# Patient Record
Sex: FEMALE | Race: WHITE | URBAN - METROPOLITAN AREA
[De-identification: names, ages, dates, MRNs, and addresses within clinical notes are randomized per-mention and may not be internally consistent; named-entity substitution may affect disease eponyms.]

---

## 2019-04-11 LAB
CREATININE, EXTERNAL: 0.76
LDL-C, EXTERNAL: 92

## 2019-11-11 LAB — MICROALBUMIN UR TEST STR-MCNC: NORMAL MG/DL

## 2020-09-14 VITALS
SYSTOLIC BLOOD PRESSURE: 132 MMHG | TEMPERATURE: 97.2 F | HEART RATE: 87 BPM | OXYGEN SATURATION: 99 % | DIASTOLIC BLOOD PRESSURE: 82 MMHG | HEIGHT: 68 IN | BODY MASS INDEX: 44.25 KG/M2 | WEIGHT: 292 LBS

## 2020-09-14 PROBLEM — E66.01 CLASS 3 SEVERE OBESITY IN ADULT (HCC): Status: ACTIVE | Noted: 2020-09-14

## 2020-09-14 PROBLEM — Q79.60 EHLERS-DANLOS SYNDROME: Status: ACTIVE | Noted: 2020-09-14

## 2020-09-14 RX ORDER — FLUTICASONE PROPIONATE 50 MCG
2 SPRAY, SUSPENSION (ML) NASAL DAILY
COMMUNITY
End: 2021-01-12

## 2021-01-12 ENCOUNTER — OFFICE VISIT (OUTPATIENT)
Dept: PRIMARY CARE CLINIC | Age: 31
End: 2021-01-12
Payer: COMMERCIAL

## 2021-01-12 VITALS
BODY MASS INDEX: 44.41 KG/M2 | SYSTOLIC BLOOD PRESSURE: 146 MMHG | HEART RATE: 90 BPM | DIASTOLIC BLOOD PRESSURE: 96 MMHG | HEIGHT: 68 IN | OXYGEN SATURATION: 98 % | WEIGHT: 293 LBS

## 2021-01-12 DIAGNOSIS — E66.01 CLASS 3 SEVERE OBESITY DUE TO EXCESS CALORIES WITH SERIOUS COMORBIDITY AND BODY MASS INDEX (BMI) OF 45.0 TO 49.9 IN ADULT (HCC): ICD-10-CM

## 2021-01-12 DIAGNOSIS — I10 ESSENTIAL HYPERTENSION: Primary | Chronic | ICD-10-CM

## 2021-01-12 PROCEDURE — 99213 OFFICE O/P EST LOW 20 MIN: CPT | Performed by: FAMILY MEDICINE

## 2021-01-12 RX ORDER — LISINOPRIL 10 MG/1
10 TABLET ORAL DAILY
Qty: 30 TAB | Refills: 2 | Status: SHIPPED | OUTPATIENT
Start: 2021-01-12 | End: 2021-05-13

## 2021-01-12 NOTE — PROGRESS NOTES
HISTORY OF PRESENT ILLNESS  Kehinde Rico is a 26 y.o. female    had PIH and they thpouhght it may have been  chrinic HTN   bc it stayed  Stage 1. Edema and liver test nml. off labetalol now. Post partum check  last week  BP was up. .      no amniea in pregnancy. prior to preg and job the  Stress level was up.     new job works thru [employer]. lost 30 bs since deliver. A lot of edema now gone. Prior to Admission medications    Medication Sig Start Date End Date Taking? Authorizing Provider   lisinopriL (PRINIVIL, ZESTRIL) 10 mg tablet Take 1 Tab by mouth daily. Indications: high blood pressure 1/12/21  Yes Seymour Prince MD        Past Medical History:   Diagnosis Date    Anxiety     GERD (gastroesophageal reflux disease)      Social History     Tobacco Use    Smoking status: Never Smoker    Smokeless tobacco: Never Used   Substance Use Topics    Alcohol use: Yes     Comment: occasional    Drug use: Not on file       Family History   Problem Relation Age of Onset    Depression Mother     Anxiety Mother     Cancer Maternal Grandmother         breast    Diabetes Maternal Grandmother     Cancer Paternal Grandfather         liver       Review of Systems   Constitutional: Negative. Negative for chills, fever, malaise/fatigue and weight loss. HENT: Negative for congestion. Eyes: Negative for blurred vision and pain. Respiratory: Negative for cough and shortness of breath. Cardiovascular: Negative for chest pain, palpitations, orthopnea and leg swelling. Gastrointestinal: Negative for abdominal pain and nausea. Genitourinary: Negative for frequency. Skin: Negative for rash. Neurological: Positive for headaches. Negative for dizziness, tremors, speech change, focal weakness and loss of consciousness. Endo/Heme/Allergies: Negative for polydipsia. Psychiatric/Behavioral: Negative for depression. The patient is not nervous/anxious. Visit Vitals  BP (!) 146/96 (BP 1 Location: Left arm, BP Patient Position: At rest)   Pulse 90   Ht 5' 8\" (1.727 m)   Wt 320 lb 6.4 oz (145.3 kg)   SpO2 98%   BMI 48.72 kg/m²        Physical Exam  Vitals signs and nursing note reviewed. Constitutional:       General: She is not in acute distress. Appearance: Normal appearance. She is obese. She is not ill-appearing. HENT:      Head: Normocephalic and atraumatic. Mouth/Throat:      Mouth: Mucous membranes are moist.   Eyes:      General: No scleral icterus. Right eye: No discharge. Left eye: No discharge. Extraocular Movements: Extraocular movements intact. Conjunctiva/sclera: Conjunctivae normal.      Pupils: Pupils are equal, round, and reactive to light. Cardiovascular:      Rate and Rhythm: Normal rate and regular rhythm. Pulses: Normal pulses. Heart sounds: Normal heart sounds. Pulmonary:      Effort: Pulmonary effort is normal. No respiratory distress. Breath sounds: Normal breath sounds. No wheezing or rales. Abdominal:      General: Abdomen is flat. Bowel sounds are normal.      Palpations: Abdomen is soft. There is no mass. Tenderness: There is no abdominal tenderness. There is no right CVA tenderness or left CVA tenderness. Musculoskeletal:      Right lower leg: No edema. Left lower leg: No edema. Lymphadenopathy:      Cervical: No cervical adenopathy. Skin:     General: Skin is warm. Capillary Refill: Capillary refill takes less than 2 seconds. Coloration: Skin is not jaundiced. Neurological:      General: No focal deficit present. Mental Status: She is alert and oriented to person, place, and time. Mental status is at baseline. Cranial Nerves: No cranial nerve deficit. Gait: Gait normal.      Deep Tendon Reflexes: Reflexes normal.   Psychiatric:         Mood and Affect: Mood normal.         Behavior: Behavior normal.         Thought Content:  Thought content normal.         Judgment: Judgment normal.         ASSESSMENT and PLAN  Diagnoses and all orders for this visit:    1. Essential hypertension  Comments:  Preg induced. add  lisnopril. BP goal < 135/85. get eye exam yrsly  Orders:  -     METABOLIC PANEL, COMPREHENSIVE  -     CBC WITH AUTOMATED DIFF  -     LIPID PANEL    2. Class 3 severe obesity due to excess calories with serious comorbidity and body mass index (BMI) of 45.0 to 49.9 in adult [facility])    Other orders  -     lisinopriL (PRINIVIL, ZESTRIL) 10 mg tablet; Take 1 Tab by mouth daily. Indications: high blood pressure         Orders Placed This Encounter    METABOLIC PANEL, COMPREHENSIVE    CBC WITH AUTOMATED DIFF    LIPID PANEL    lisinopriL (PRINIVIL, ZESTRIL) 10 mg tablet     Follow-up and Dispositions    · Return in about 3 months (around 4/12/2021) for Chronic office visit, Medciation change follow up.

## 2021-01-21 PROBLEM — I10 ESSENTIAL HYPERTENSION: Chronic | Status: ACTIVE | Noted: 2021-01-21

## 2021-01-27 LAB
ALBUMIN SERPL-MCNC: 4.3 G/DL (ref 3.8–4.8)
ALBUMIN/GLOB SERPL: 1.7 {RATIO} (ref 1.2–2.2)
ALP SERPL-CCNC: 93 IU/L (ref 39–117)
ALT SERPL-CCNC: 18 IU/L (ref 0–32)
AST SERPL-CCNC: 16 IU/L (ref 0–40)
BASOPHILS # BLD AUTO: 0.1 X10E3/UL (ref 0–0.2)
BASOPHILS NFR BLD AUTO: 1 %
BILIRUB SERPL-MCNC: 0.3 MG/DL (ref 0–1.2)
BUN SERPL-MCNC: 14 MG/DL (ref 6–20)
BUN/CREAT SERPL: 17 (ref 9–23)
CALCIUM SERPL-MCNC: 9.1 MG/DL (ref 8.7–10.2)
CHLORIDE SERPL-SCNC: 104 MMOL/L (ref 96–106)
CHOLEST SERPL-MCNC: 203 MG/DL (ref 100–199)
CO2 SERPL-SCNC: 24 MMOL/L (ref 20–29)
CREAT SERPL-MCNC: 0.81 MG/DL (ref 0.57–1)
EOSINOPHIL # BLD AUTO: 0.2 X10E3/UL (ref 0–0.4)
EOSINOPHIL NFR BLD AUTO: 3 %
ERYTHROCYTE [DISTWIDTH] IN BLOOD BY AUTOMATED COUNT: 13.4 % (ref 11.7–15.4)
GLOBULIN SER CALC-MCNC: 2.6 G/DL (ref 1.5–4.5)
GLUCOSE SERPL-MCNC: 89 MG/DL (ref 65–99)
HCT VFR BLD AUTO: 44.3 % (ref 34–46.6)
HDLC SERPL-MCNC: 50 MG/DL
HGB BLD-MCNC: 14.4 G/DL (ref 11.1–15.9)
IMM GRANULOCYTES # BLD AUTO: 0 X10E3/UL (ref 0–0.1)
IMM GRANULOCYTES NFR BLD AUTO: 0 %
LDLC SERPL CALC-MCNC: 128 MG/DL (ref 0–99)
LYMPHOCYTES # BLD AUTO: 1.8 X10E3/UL (ref 0.7–3.1)
LYMPHOCYTES NFR BLD AUTO: 24 %
MCH RBC QN AUTO: 27.6 PG (ref 26.6–33)
MCHC RBC AUTO-ENTMCNC: 32.5 G/DL (ref 31.5–35.7)
MCV RBC AUTO: 85 FL (ref 79–97)
MONOCYTES # BLD AUTO: 0.4 X10E3/UL (ref 0.1–0.9)
MONOCYTES NFR BLD AUTO: 6 %
NEUTROPHILS # BLD AUTO: 4.8 X10E3/UL (ref 1.4–7)
NEUTROPHILS NFR BLD AUTO: 66 %
PLATELET # BLD AUTO: 405 X10E3/UL (ref 150–450)
POTASSIUM SERPL-SCNC: 4.7 MMOL/L (ref 3.5–5.2)
PROT SERPL-MCNC: 6.9 G/DL (ref 6–8.5)
RBC # BLD AUTO: 5.21 X10E6/UL (ref 3.77–5.28)
SODIUM SERPL-SCNC: 140 MMOL/L (ref 134–144)
TRIGL SERPL-MCNC: 139 MG/DL (ref 0–149)
VLDLC SERPL CALC-MCNC: 25 MG/DL (ref 5–40)
WBC # BLD AUTO: 7.3 X10E3/UL (ref 3.4–10.8)

## 2021-01-28 NOTE — PROGRESS NOTES
Normal  blood count, normal liver and kidney function and normal blood sugar.  goal <80/  Increase activity and decrease fatty food intake.   Omega 3 may help cholesterols

## 2021-05-13 ENCOUNTER — VIRTUAL VISIT (OUTPATIENT)
Dept: PRIMARY CARE CLINIC | Age: 31
End: 2021-05-13

## 2021-05-13 DIAGNOSIS — I10 ESSENTIAL HYPERTENSION: Chronic | ICD-10-CM

## 2021-05-13 DIAGNOSIS — M54.32 SCIATICA OF LEFT SIDE: ICD-10-CM

## 2021-05-13 DIAGNOSIS — Q79.60 EHLERS-DANLOS SYNDROME: Chronic | ICD-10-CM

## 2021-05-13 DIAGNOSIS — F41.8 MIXED ANXIETY AND DEPRESSIVE DISORDER: Primary | ICD-10-CM

## 2021-05-13 PROCEDURE — 99214 OFFICE O/P EST MOD 30 MIN: CPT | Performed by: NURSE PRACTITIONER

## 2021-05-13 RX ORDER — IBUPROFEN 800 MG/1
800 TABLET ORAL
Qty: 60 TAB | Refills: 1 | Status: SHIPPED | OUTPATIENT
Start: 2021-05-13

## 2021-05-13 RX ORDER — METHOCARBAMOL 750 MG/1
750 TABLET, FILM COATED ORAL 4 TIMES DAILY
Qty: 40 TAB | Refills: 0 | Status: SHIPPED | OUTPATIENT
Start: 2021-05-13 | End: 2021-05-23

## 2021-05-13 RX ORDER — DULOXETIN HYDROCHLORIDE 20 MG/1
20 CAPSULE, DELAYED RELEASE ORAL DAILY
Qty: 90 CAP | Refills: 0 | Status: SHIPPED | OUTPATIENT
Start: 2021-05-13 | End: 2021-08-02

## 2021-05-13 RX ORDER — PREDNISONE 20 MG/1
60 TABLET ORAL
Qty: 15 TAB | Refills: 0 | Status: SHIPPED | OUTPATIENT
Start: 2021-05-13 | End: 2021-05-18

## 2021-05-13 NOTE — PROGRESS NOTES
1. Have you been to the ER, urgent care clinic since your last visit? Hospitalized since your last visit? No    2. Have you seen or consulted any other health care providers outside of the [address] [place] [address] since your last visit? Include any pap smears or colon screening.  No

## 2021-05-13 NOTE — PATIENT INSTRUCTIONS
High Blood Pressure: Care Instructions Overview It's normal for blood pressure to go up and down throughout the day. But if it stays up, you have high blood pressure. Another name for high blood pressure is hypertension. Despite what a lot of people think, high blood pressure usually doesn't cause headaches or make you feel dizzy or lightheaded. It usually has no symptoms. But it does increase your risk of stroke, heart attack, and other problems. You and your doctor will talk about your risks of these problems based on your blood pressure. Your doctor will give you a goal for your blood pressure. Your goal will be based on your health and your age. Lifestyle changes, such as eating healthy and being active, are always important to help lower blood pressure. You might also take medicine to reach your blood pressure goal. 
Follow-up care is a key part of your treatment and safety. Be sure to make and go to all appointments, and call your doctor if you are having problems. It's also a good idea to know your test results and keep a list of the medicines you take. How can you care for yourself at home? Medical treatment · If you stop taking your medicine, your blood pressure will go back up. You may take one or more types of medicine to lower your blood pressure. Be safe with medicines. Take your medicine exactly as prescribed. Call your doctor if you think you are having a problem with your medicine. · Talk to your doctor before you start taking aspirin every day. Aspirin can help certain people lower their risk of a heart attack or stroke. But taking aspirin isn't right for everyone, because it can cause serious bleeding. · See your doctor regularly. You may need to see the doctor more often at first or until your blood pressure comes down. · If you are taking blood pressure medicine, talk to your doctor before you take decongestants or anti-inflammatory medicine, such as ibuprofen.  Some of these medicines can raise blood pressure. · Learn how to check your blood pressure at home. Lifestyle changes · Stay at a healthy weight. This is especially important if you put on weight around the waist. Losing even 10 pounds can help you lower your blood pressure. · If your doctor recommends it, get more exercise. Walking is a good choice. Bit by bit, increase the amount you walk every day. Try for at least 30 minutes on most days of the week. You also may want to swim, bike, or do other activities. · Avoid or limit alcohol. Talk to your doctor about whether you can drink any alcohol. · Try to limit how much sodium you eat to less than 2,300 milligrams (mg) a day. Your doctor may ask you to try to eat less than 1,500 mg a day. · Eat plenty of fruits (such as bananas and oranges), vegetables, legumes, whole grains, and low-fat dairy products. · Lower the amount of saturated fat in your diet. Saturated fat is found in animal products such as milk, cheese, and meat. Limiting these foods may help you lose weight and also lower your risk for heart disease. · Do not smoke. Smoking increases your risk for heart attack and stroke. If you need help quitting, talk to your doctor about stop-smoking programs and medicines. These can increase your chances of quitting for good. When should you call for help? Call  911 anytime you think you may need emergency care. This may mean having symptoms that suggest that your blood pressure is causing a serious heart or blood vessel problem. Your blood pressure may be over 180/120. For example, call 911 if: 
  · You have symptoms of a heart attack. These may include: 
? Chest pain or pressure, or a strange feeling in the chest. 
? Sweating. ? Shortness of breath. ? Nausea or vomiting. ? Pain, pressure, or a strange feeling in the back, neck, jaw, or upper belly or in one or both shoulders or arms. ? Lightheadedness or sudden weakness.  
? A fast or irregular heartbeat.  
  · You have symptoms of a stroke. These may include: 
? Sudden numbness, tingling, weakness, or loss of movement in your face, arm, or leg, especially on only one side of your body. ? Sudden vision changes. ? Sudden trouble speaking. ? Sudden confusion or trouble understanding simple statements. ? Sudden problems with walking or balance. ? A sudden, severe headache that is different from past headaches.  
  · You have severe back or belly pain. Do not wait until your blood pressure comes down on its own. Get help right away. Call your doctor now or seek immediate care if: 
  · Your blood pressure is much higher than normal (such as 180/120 or higher), but you don't have symptoms.  
  · You think high blood pressure is causing symptoms, such as: 
? Severe headache. 
? Blurry vision. Watch closely for changes in your health, and be sure to contact your doctor if: 
  · Your blood pressure measures higher than your doctor recommends at least 2 times. That means the top number is higher or the bottom number is higher, or both.  
  · You think you may be having side effects from your blood pressure medicine. Where can you learn more? Go to http://frankie-neva.info/ Enter P231 in the search box to learn more about \"High Blood Pressure: Care Instructions. \" Current as of: August 31, 2020               Content Version: 12.8 © 2006-2021 [employer]. Care instructions adapted under license by [employer] (which disclaims liability or warranty for this information). If you have questions about a medical condition or this instruction, always ask your healthcare professional. [place] [address] any warranty or liability for your use of this information.

## 2021-05-13 NOTE — PROGRESS NOTES
HISTORY OF PRESENT ILLNESS  Cruzito Snyder is a 26 y.o. female presents via telemedicine for increasing anxiety and maybe depression since having a baby in November thinks maybe worse in anxiety but admits it is probably a mix    Patient complains of 3 days of left-sided back and hip pain down the leg confirms has history of sciatica type pain intermittently last flareup was 4 years ago does not know causation factor for this episode has been taking ibuprofen for the last 3 days and using ice    Follow-up on hypertension post delivery of child was on lisinopril 10 mg using half a tablet daily and not affecting her blood pressure switched to 10 mg full tablet and was getting dizzy all the time now blood pressures remain give her take about 130/80 and does not take any lisinopril will be watching and sending us blood pressures from her home machine via the [employer]    There were no vitals filed for this visit. Patient Active Problem List   Diagnosis Code    Class 3 severe obesity in [facility]) E66.01    Ronnie-Danlos syndrome Q79.60    Essential hypertension I10     Patient Active Problem List    Diagnosis Date Noted    Essential hypertension 2021    Class 3 severe obesity in [facility]) 2020    Ronnie-Danlos syndrome 2020     Current Outpatient Medications   Medication Sig Dispense Refill    lisinopriL (PRINIVIL, ZESTRIL) 10 mg tablet Take 1 Tab by mouth daily.  Indications: high blood pressure 30 Tab 2     No Known Allergies  Past Medical History:   Diagnosis Date    Anxiety     GERD (gastroesophageal reflux disease)      Past Surgical History:   Procedure Laterality Date    HX  SECTION      HX HEENT  2016    dental surgery     Family History   Problem Relation Age of Onset    Depression Mother     Anxiety Mother     Cancer Maternal Grandmother         breast    Diabetes Maternal Grandmother     Cancer Paternal Grandfather         liver     Social History Tobacco Use    Smoking status: Never Smoker    Smokeless tobacco: Never Used   Substance Use Topics    Alcohol use: Yes     Comment: occasional           Review of Systems   Constitutional: Negative for fever. Respiratory: Negative for cough and shortness of breath. Cardiovascular: Negative for chest pain and palpitations. Gastrointestinal: Negative for constipation and diarrhea. Musculoskeletal: Positive for back pain and joint pain. Neurological: Negative for dizziness. Psychiatric/Behavioral: Positive for depression. The patient is nervous/anxious. The patient does not have insomnia. Physical Exam  Constitutional:       Appearance: Normal appearance. She is obese. Comments: As seen on video chat   HENT:      Head: Normocephalic and atraumatic. Comments: As seen on video chat     Nose: Nose normal.      Comments: As seen on video chat  Eyes:      Extraocular Movements: Extraocular movements intact. Comments: As seen on video chat   Neck:      Musculoskeletal: Normal range of motion. Comments: As seen on video chat  Pulmonary:      Effort: Pulmonary effort is normal. No respiratory distress. Neurological:      General: No focal deficit present. Mental Status: She is alert and oriented to person, place, and time. Comments: As seen on video chat   Psychiatric:         Mood and Affect: Mood normal.         Behavior: Behavior normal.         Thought Content: Thought content normal.         Judgment: Judgment normal.           ASSESSMENT and PLAN  Diagnoses and all orders for this visit:    1. Mixed anxiety and depressive disorder  Comments: Will start on Cymbalta follow-up in 6 to 12 weeks may have some postpartum depression  Orders:  -     DULoxetine (CYMBALTA) 20 mg capsule; Take 1 Cap by mouth daily.     2. Ronnie-Danlos syndrome  Comments:  Chronic condition we will choose Cymbalta for depression anxiety hopefully help some of the pain associated  Orders:  -     DULoxetine (CYMBALTA) 20 mg capsule; Take 1 Cap by mouth daily. 3. Essential hypertension  Comments:  History of post delivery hypertension baby was born November 2020 currently not on meds blood pressure remains borderline will watch    4. Sciatica of left side  Comments:  Has been taking ibuprofen and ice will add on prednisone for 5 days continue with ibuprofen told to take Pepcid and eat meals with ibuprofen and muscle relaxers  Orders:  -     predniSONE (DELTASONE) 20 mg tablet; Take 60 mg by mouth daily (with breakfast) for 5 days. -     methocarbamoL (ROBAXIN) 750 mg tablet; Take 1 Tab by mouth four (4) times daily for 10 days. -     ibuprofen (MOTRIN) 800 mg tablet; Take 1 Tab by mouth every six (6) hours as needed for Pain. Benji Dubois who was evaluated through a synchronous (real-time) audio-video encounter, and/or his healthcare decision maker, is aware that it is a billable service, with coverage as determined by his insurance carrier. He provided verbal consent to proceed: Yes, and patient identification was verified. It was conducted pursuant to the emergency declaration under the [facility], [address] [place] [address] authority and the [employer] Act. A caregiver was present when appropriate. Ability to conduct physical exam was limited. I was at home. The patient was at home.           Malcolm Doss NP

## 2021-08-02 DIAGNOSIS — F41.8 MIXED ANXIETY AND DEPRESSIVE DISORDER: ICD-10-CM

## 2021-08-02 DIAGNOSIS — Q79.60 EHLERS-DANLOS SYNDROME: Chronic | ICD-10-CM

## 2021-08-02 RX ORDER — DULOXETIN HYDROCHLORIDE 20 MG/1
CAPSULE, DELAYED RELEASE ORAL
Qty: 30 CAPSULE | Refills: 2 | Status: SHIPPED | OUTPATIENT
Start: 2021-08-02

## 2022-03-19 PROBLEM — Q79.60 EHLERS-DANLOS SYNDROME: Status: ACTIVE | Noted: 2020-09-14

## 2022-03-19 PROBLEM — E66.01 CLASS 3 SEVERE OBESITY IN ADULT (HCC): Status: ACTIVE | Noted: 2020-09-14

## 2022-03-19 PROBLEM — I10 ESSENTIAL HYPERTENSION: Status: ACTIVE | Noted: 2021-01-21

## 2023-05-10 RX ORDER — DULOXETIN HYDROCHLORIDE 20 MG/1
1 CAPSULE, DELAYED RELEASE ORAL DAILY
COMMUNITY
Start: 2021-08-02

## 2023-05-10 RX ORDER — IBUPROFEN 800 MG/1
TABLET ORAL EVERY 6 HOURS PRN
COMMUNITY
Start: 2021-05-13